# Patient Record
(demographics unavailable — no encounter records)

---

## 2024-11-13 NOTE — HISTORY OF PRESENT ILLNESS
[FreeTextEntry1] : 29 year old with history of IBS diarrhea type (prior colonoscopy okay), ovarain cyst   Patient has seen rheumatologist at [facility] (Dr. 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   Review of rheumatology note from that office shows history of negative CRISTI SSA SSB dsDNA Suárez RNP CCP Lyme ESR CRP   She also had x-ray of the SI joints and lumbar spine in March 2024 which were within normal limit and an MRI of the SI joints with contrast in April 2024 with no SI joint inflammation. Per patient, she had hand ultrasound and x-rays done which were negative but I only have the results of right finger x-ray done on12/6/2023 which show no acute osseous abnormality   She was tried on sulfasalazine but dc'd due to allergic rash  She was put on diclofenac 75 mg which helped with the swelling and pain, mainly used during menstruation.   Patient was last seen by previous rheumatologist in May 2024 with plan to do a trial of Simponi Aria subcutaneous monthly. Methotrexate was avoided due to future pregnancy planning. She had the Simponi Aria at home but has not tried it yet. She wants 2nd opinion.  Denies dactylitis or enthesitis or IBD.  Has achiness in lower back worse in AM which can last up to 1 hour.  Chronic fatigue  Sometimes numbness and tingling in hands.   Patient denies other joint pains, joint swelling, joint erythema/warmth, , fatigue, fever, chills, , nasopharyngeal ulcers, chest pain, abdominal pain, palpitations, cough, SOB, nausea, vomiting, diarrhea, constipation, blood in stool, dysuria, hematuria, rash, photosensitivity, Raynaud's, alopecia, dry eyes, dry mouth,eye pain/redness, vision changes, myalgias, muscle weakness, dysphagia, jaw claudication, numbness/tingling miscarriages, Hx of DVT/PEs.   Since last visit,     -Did MRI right hand with contrast which was normal - Reports no significant back pain - Having hand pain swelling redness during or right before her menstruation. Reports "bumps" on her fingers during this time.      PMHx: As above  PSHx: denies  Family Hx:  Mother has RA, and maternal aunt has Crohn's  Social Hx:  Smoking Hx: vapes occasionally  EtOH Hx: occasional  Drug use: denies  Occupation: works in graphic design  Single

## 2024-11-13 NOTE — PHYSICAL EXAM
[TextEntry] : GENERAL: Appears in no acute distress HEENT: EOMI. No conjunctival erythema. Moist mucous membranes. No nasopharyngeal ulcers NECK: Supple, no cervical lymphadenopathy CARDIOVASCULAR: RRR. S1, S2 auscultated. No murmurs or rubs. PULMONARY: Clear to auscultation b/l, no wheezes, rales, or crackles MSK: No active synovitis No joint tenderness to palpation. No Bouchards or Heberdens nodes No deformities. No crepitus. Normal ROM of  b/l upper and lower extremities. Slight limited ROM of back No dactylitis, enthesitis, nail pitting SKIN: No lesions or rashes NEURO: No focal deficits.  PSYCH: . Normal affect and thought process.

## 2024-11-13 NOTE — ASSESSMENT
[FreeTextEntry1] : 29 year old with history of IBS diarrhea type (prior colonoscopy okay), ovarain cyst   Patient has seen rheumatologist at [facility] (Dr. 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  Review of rheumatology note from that office shows history of negative CRISTI SSA SSB dsDNA Suárez RNP CCP Lyme ESR CRP  She also had x-ray of the SI joints and lumbar spine in March 2024 which were within normal limit and an MRI of the SI joints with contrast in April 2024 with no SI joint inflammation. Per patient, she had hand ultrasound and x-rays done which were negative but I only have the results of right finger x-ray done on12/6/2023 which show no acute osseous abnormality  She was tried on sulfasalazine but dc'd due to allergic rash  She was put on diclofenac 75 mg which helped with the swelling and pain, mainly used during menstruation.   Patient was last seen by previous rheumatologist in May 2024 with plan to do a trial of Simponi Aria subcutaneous monthly. Methotrexate was avoided due to future pregnancy planning. She has the Simponi Aria at home but has not tried it yet. She wants 2nd opinion.   Denies dactylitis or enthesitis or IBD.  Has achiness in lower back worse in AM which can last up to 1 hour.  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 Moreover, her ESR/CRP have been normal too. I do no think her symptoms are due to   undifferentiated spondylarthritis  - Plan is to continue diclofenac (with food) during flares and monitor her symptoms. Meanwhile, she will also repeat ESR/CRP during her flares. It seems like her symptoms are more in her skin/subcutaneous area rather than coming from her joint. The MRI of hands was done during a flare and it was done with contrast - imaging was completely normal -Patient has failed sulfasalazine due to allergic reaction  -Could not do methotrexate due to pregnancy planning  -Labs ordered - Continue to monitor symptoms - She is also seeing derm (who gave her some topicals) and gyn (for evaluation of any hormone involvement)  Follow up 3 months   Total time spent in review of patient history, clinical exam, management, counseling, and plan of care:  30min

## 2024-11-13 NOTE — ASSESSMENT
[FreeTextEntry1] : 29 year old with history of IBS diarrhea type (prior colonoscopy okay), ovarain cyst   Patient has seen rheumatologist at [facility] (Dr. 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  Review of rheumatology note from that office shows history of negative CRISTI SSA SSB dsDNA Suárez RNP CCP Lyme ESR CRP  She also had x-ray of the SI joints and lumbar spine in March 2024 which were within normal limit and an MRI of the SI joints with contrast in April 2024 with no SI joint inflammation. Per patient, she had hand ultrasound and x-rays done which were negative but I only have the results of right finger x-ray done on12/6/2023 which show no acute osseous abnormality  She was tried on sulfasalazine but dc'd due to allergic rash  She was put on diclofenac 75 mg which helped with the swelling and pain, mainly used during menstruation.   Patient was last seen by previous rheumatologist in May 2024 with plan to do a trial of Simponi Aria subcutaneous monthly. Methotrexate was avoided due to future pregnancy planning. She has the Simponi Aria at home but has not tried it yet. She wants 2nd opinion.   Denies dactylitis or enthesitis or IBD.  Has achiness in lower back worse in AM which can last up to 1 hour.  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 Moreover, her ESR/CRP have been normal too. I do no think her symptoms are due to   undifferentiated spondylarthritis  - Plan is to continue diclofenac (with food) during flares and monitor her symptoms. Meanwhile, she will also repeat ESR/CRP during her flares. It seems like her symptoms are more in her skin/subcutaneous area rather than coming from her joint. The MRI of hands was done during a flare and it was done with contrast - imaging was completely normal -Patient has failed sulfasalazine due to allergic reaction  -Could not do methotrexate due to pregnancy planning  -Labs ordered - Continue to monitor symptoms - She is also seeing derm (who gave her some topicals) and gyn (for evaluation of any hormone involvement)  Follow up 3 months   Total time spent in review of patient history, clinical exam, management, counseling, and plan of care:  30min

## 2024-11-13 NOTE — HISTORY OF PRESENT ILLNESS
[FreeTextEntry1] : 29 year old with history of IBS diarrhea type (prior colonoscopy okay), ovarain cyst   Patient has seen rheumatologist at [facility] (Dr. 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   Review of rheumatology note from that office shows history of negative CRISTI SSA SSB dsDNA Suárez RNP CCP Lyme ESR CRP   She also had x-ray of the SI joints and lumbar spine in March 2024 which were within normal limit and an MRI of the SI joints with contrast in April 2024 with no SI joint inflammation. Per patient, she had hand ultrasound and x-rays done which were negative but I only have the results of right finger x-ray done on12/6/2023 which show no acute osseous abnormality   She was tried on sulfasalazine but dc'd due to allergic rash  She was put on diclofenac 75 mg which helped with the swelling and pain, mainly used during menstruation.   Patient was last seen by previous rheumatologist in May 2024 with plan to do a trial of Simponi Aria subcutaneous monthly. Methotrexate was avoided due to future pregnancy planning. She had the Simponi Aria at home but has not tried it yet. She wants 2nd opinion.  Denies dactylitis or enthesitis or IBD.  Has achiness in lower back worse in AM which can last up to 1 hour.  Chronic fatigue  Sometimes numbness and tingling in hands.   Patient denies other joint pains, joint swelling, joint erythema/warmth, , fatigue, fever, chills, , nasopharyngeal ulcers, chest pain, abdominal pain, palpitations, cough, SOB, nausea, vomiting, diarrhea, constipation, blood in stool, dysuria, hematuria, rash, photosensitivity, Raynaud's, alopecia, dry eyes, dry mouth,eye pain/redness, vision changes, myalgias, muscle weakness, dysphagia, jaw claudication, numbness/tingling miscarriages, Hx of DVT/PEs.   Since last visit,     -Did MRI right hand with contrast which was normal - Reports no significant back pain - Having hand pain swelling redness during or right before her menstruation. Reports "bumps" on her fingers during this time.      PMHx: As above  PSHx: denies  Family Hx:  Mother has RA, and maternal aunt has Crohn's  Social Hx:  Smoking Hx: vapes occasionally  EtOH Hx: occasional  Drug use: denies  Occupation: works in graphic design  Single

## 2025-04-02 NOTE — HISTORY OF PRESENT ILLNESS
[FreeTextEntry1] : HPI: Patient is a 28yo female presenting with GI reaction to OCPs used for menstrual regulation of pain and possible underlying endometriosis. Pt is taking Junel 1/20 Fe. Patient notes cyclic arthritic pain resolved with hormonal regulation - saw rheumatology and no longer has diagnosis. Pt requests endocrine referral  Plan: - Endocrine referral placed - Discussed OCPs without Fe, lower dose, and other forms that bypass the GI tract like ortho evra patch and nuvaring - Discussed benefits, side effects and risks. Pt ultimately elected for ortho evra patch - Instructions reviewed and patch rx'd today 30min spent ODALIS Haile MD